# Patient Record
Sex: MALE | Race: ASIAN | URBAN - METROPOLITAN AREA
[De-identification: names, ages, dates, MRNs, and addresses within clinical notes are randomized per-mention and may not be internally consistent; named-entity substitution may affect disease eponyms.]

---

## 2017-02-28 ENCOUNTER — OFFICE VISIT (OUTPATIENT)
Dept: FAMILY MEDICINE | Facility: CLINIC | Age: 27
End: 2017-02-28
Payer: COMMERCIAL

## 2017-02-28 VITALS
OXYGEN SATURATION: 98 % | SYSTOLIC BLOOD PRESSURE: 90 MMHG | DIASTOLIC BLOOD PRESSURE: 62 MMHG | HEART RATE: 107 BPM | BODY MASS INDEX: 25.1 KG/M2 | TEMPERATURE: 97.4 F | WEIGHT: 165.6 LBS | HEIGHT: 68 IN

## 2017-02-28 DIAGNOSIS — M54.50 ACUTE MIDLINE LOW BACK PAIN WITHOUT SCIATICA: Primary | ICD-10-CM

## 2017-02-28 PROCEDURE — 99203 OFFICE O/P NEW LOW 30 MIN: CPT | Performed by: INTERNAL MEDICINE

## 2017-02-28 RX ORDER — CYCLOBENZAPRINE HCL 10 MG
5-10 TABLET ORAL 3 TIMES DAILY PRN
Qty: 30 TABLET | Refills: 1 | Status: SHIPPED | OUTPATIENT
Start: 2017-02-28 | End: 2018-02-15

## 2017-02-28 NOTE — PATIENT INSTRUCTIONS
You can take Aleve 1-2 tabs twice daily for 5-7 days.  This may help with some of the inflammation.      Make sure to walk every hour if you are able.  The more active you can be the better.

## 2017-02-28 NOTE — PROGRESS NOTES
"  SUBJECTIVE:                                                    Darren French is a 27 year old male who presents to clinic today for the following health issues:      Joint Pain/ back pain      Onset: 2 weeks ago     Description:   Location: lower back   Character: Sharp    Intensity: moderate, severe    Progression of Symptoms: intermittent    Accompanying Signs & Symptoms:  Other symptoms: none   History:   Previous similar pain: YES      Precipitating factors:   Trauma or overuse: YES, sits at work, prolonged sitting increases the pain     Alleviating factors:  Improved by: go for walk        Therapies Tried and outcome: bengay cream, somewhat helps     Darren is here with lower back pain x 2 weeks.  There were no preceding injuries.  He describes it as a \"stinging\" pain in the lower back; it does not radiate.  No symptoms in his legs.  He recalls having similar problems when he was younger, but the pain is worse now.  At it's worst the pain is an 8/10.  Does not wake him up at night.  It gets much better with activity.  He denies any back stiffness. He has no family history of early onset arthritis.     Darren is otherwise healthy and on no chronic medications.  He works for [employer] in [place].  He has been working long hours lately.      ROS:  Const, msk, neuro, skin reviewed, negative unless noted above.     OBJECTIVE:                                                    BP 90/62 (BP Location: Right arm, Patient Position: Chair, Cuff Size: Adult Regular)  Pulse 107  Temp 97.4  F (36.3  C) (Tympanic)  Ht 5' 7.5\" (1.715 m)  Wt 165 lb 9.6 oz (75.1 kg)  SpO2 98%  BMI 25.55 kg/m2  Body mass index is 25.55 kg/(m^2).    Gen: well appearing, pleasant young man, no distress  HEENT: PERRL, sclera nonicteric, MMM  Pulm: breathing comfortably, CTAB, no wheezes or rales  CV: RRR, normal S1 and S2, no murmurs  MSK: no spinal tenderness. Is tender over SI joints and lumbar paraspinal muscles.  Good forward flexion. "   Neuro: full strength in LEs b/l. 2+ patellar reflexes, symmetric.  Normal gait.       Diagnostic Test Results:  none      ASSESSMENT/PLAN:                                                        1. Acute midline low back pain without sciatica - localized pain without radiation, no injury, improves with movement.  Given his young age and SI joint pain, considered AS but he has no stiffness and seems to have good ROM with forward flexion.   - cyclobenzaprine (FLEXERIL) 10 MG tablet; Take 0.5-1 tablets (5-10 mg) by mouth 3 times daily as needed for muscle spasms  Dispense: 30 tablet; Refill: 1  - recommended aleve BID for one week   - try to get up at least briefly every hour at work.   - try heating pad and/or ice     F/U as needed for persistent or worsening symptoms.       Jennifer Marina MD  [facility]

## 2017-02-28 NOTE — MR AVS SNAPSHOT
After Visit Summary   2/28/2017    Darren French    MRN: [id number]           Patient Information     Date Of Birth          1990        Visit Information        Provider Department      2/28/2017 8:00 AM Jennifer Marina MD [facility]        Today's Diagnoses     Acute midline low back pain without sciatica    -  1      Care Instructions    You can take Aleve 1-2 tabs twice daily for 5-7 days.  This may help with some of the inflammation.      Make sure to walk every hour if you are able.  The more active you can be the better.         Follow-ups after your visit        Your next 10 appointments already scheduled     Mar 07, 2017  3:00 PM CST   Office Visit with Chemo Farias MD   [facility]    [address] [address] [phone]           Bring a current list of meds and any records pertaining to this visit.  For Physicals, please bring immunization records and any forms needing to be filled out.  Please arrive 10 minutes early to complete paperwork.              Who to contact     If you have questions or need follow up information about today's clinic visit or your schedule please contact [facility] directly at [phone].  Normal or non-critical lab and imaging results will be communicated to you by Numotehart, letter or phone within 4 business days after the clinic has received the results. If you do not hear from us within 7 days, please contact the clinic through Numotehart or phone. If you have a critical or abnormal lab result, we will notify you by phone as soon as possible.  Submit refill requests through Quanterix or call your pharmacy and they will forward the refill request to us. Please allow 3 business days for your refill to be completed.          Additional Information About Your Visit        NumoteharInMyShow Information     Quanterix lets you send messages to your doctor,  "view your test results, renew your prescriptions, schedule appointments and more. To sign up, go to [email or web address] . Click on \"Log in\" on the left side of the screen, which will take you to the Welcome page. Then click on \"Sign up Now\" on the right side of the page.     You will be asked to enter the access code listed below, as well as some personal information. Please follow the directions to create your username and password.     Your access code is: [email or web address]  Expires: 2017  8:28 AM     Your access code will  in 90 days. If you need help or a new code, please call your [place] clinic or [phone].        Care EveryWhere ID     This is your Care EveryWhere ID. This could be used by other organizations to access your [place] medical records  [id number]        Your Vitals Were     Pulse Temperature Height Pulse Oximetry BMI (Body Mass Index)       107 97.4  F (36.3  C) (Tympanic) 5' 7.5\" (1.715 m) 98% 25.55 kg/m2        Blood Pressure from Last 3 Encounters:   17 90/62    Weight from Last 3 Encounters:   17 165 lb 9.6 oz (75.1 kg)              Today, you had the following     No orders found for display         Today's Medication Changes          These changes are accurate as of: 17  8:28 AM.  If you have any questions, ask your nurse or doctor.               Start taking these medicines.        Dose/Directions    cyclobenzaprine 10 MG tablet   Commonly known as:  FLEXERIL   Used for:  Acute midline low back pain without sciatica   Started by:  Jennifer Marina MD        Dose:  5-10 mg   Take 0.5-1 tablets (5-10 mg) by mouth 3 times daily as needed for muscle spasms   Quantity:  30 tablet   Refills:  1            Where to get your medicines      These medications were sent to [facility] [place] [address]  [address] [address]     Phone:  [phone]     cyclobenzaprine 10 MG tablet       "          Primary Care Provider    None Specified       No primary provider on file.        Thank you!     Thank you for choosing [facility]  for your care. Our goal is always to provide you with excellent care. Hearing back from our patients is one way we can continue to improve our services. Please take a few minutes to complete the written survey that you may receive in the mail after your visit with us. Thank you!             Your Updated Medication List - Protect others around you: Learn how to safely use, store and throw away your medicines at www.disposemymeds.org.          This list is accurate as of: 2/28/17  8:28 AM.  Always use your most recent med list.                   Brand Name Dispense Instructions for use    cyclobenzaprine 10 MG tablet    FLEXERIL    30 tablet    Take 0.5-1 tablets (5-10 mg) by mouth 3 times daily as needed for muscle spasms

## 2017-02-28 NOTE — NURSING NOTE
"Chief Complaint   Patient presents with     Back Pain       Initial BP 90/62 (BP Location: Right arm, Patient Position: Chair, Cuff Size: Adult Regular)  Pulse 107  Temp 97.4  F (36.3  C) (Tympanic)  Ht 5' 7.5\" (1.715 m)  Wt 165 lb 9.6 oz (75.1 kg)  SpO2 98%  BMI 25.55 kg/m2 Estimated body mass index is 25.55 kg/(m^2) as calculated from the following:    Height as of this encounter: 5' 7.5\" (1.715 m).    Weight as of this encounter: 165 lb 9.6 oz (75.1 kg).  Medication Reconciliation: complete  "

## 2017-10-26 ENCOUNTER — OFFICE VISIT (OUTPATIENT)
Dept: FAMILY MEDICINE | Facility: CLINIC | Age: 27
End: 2017-10-26
Payer: COMMERCIAL

## 2017-10-26 ENCOUNTER — OFFICE VISIT (OUTPATIENT)
Dept: SURGERY | Facility: CLINIC | Age: 27
End: 2017-10-26
Payer: COMMERCIAL

## 2017-10-26 VITALS
DIASTOLIC BLOOD PRESSURE: 86 MMHG | HEIGHT: 67 IN | HEART RATE: 105 BPM | WEIGHT: 159 LBS | SYSTOLIC BLOOD PRESSURE: 138 MMHG | BODY MASS INDEX: 24.96 KG/M2

## 2017-10-26 VITALS
SYSTOLIC BLOOD PRESSURE: 120 MMHG | TEMPERATURE: 97.8 F | HEART RATE: 110 BPM | WEIGHT: 157 LBS | OXYGEN SATURATION: 97 % | BODY MASS INDEX: 23.79 KG/M2 | RESPIRATION RATE: 12 BRPM | HEIGHT: 68 IN | DIASTOLIC BLOOD PRESSURE: 84 MMHG

## 2017-10-26 DIAGNOSIS — L05.01 PILONIDAL CYST WITH ABSCESS: Primary | ICD-10-CM

## 2017-10-26 PROCEDURE — 99213 OFFICE O/P EST LOW 20 MIN: CPT | Performed by: INTERNAL MEDICINE

## 2017-10-26 PROCEDURE — 99203 OFFICE O/P NEW LOW 30 MIN: CPT | Performed by: SURGERY

## 2017-10-26 NOTE — MR AVS SNAPSHOT
After Visit Summary   10/26/2017    Darren French    MRN: [id number]           Patient Information     Date Of Birth          1990        Visit Information        Provider Department      10/26/2017 10:00 AM Minor Laura MD [facility]        Today's Diagnoses     Pilonidal cyst with abscess    -  1       Follow-ups after your visit        Additional Services     COLORECTAL SURGERY REFERRAL       Your provider has referred you to: [facility]: [facility] [phone]  [email or web address]    Referral Reason(s):  Infected pilonidal cyst  Special Concerns: None  This referral is: Emergent (24 hours)  It is OK to leave a message on patient's voicemail.    Please be aware that coverage of these services is subject to the terms and limitations of your health insurance plan.  Call member services at your health plan with any benefit or coverage questions.      Please bring the following with you to your appointment:    (1) Any X-Rays, CTs or MRIs which have been performed.  Contact the facility where they were done to arrange for  prior to your scheduled appointment.    (2) List of current medications  (3) This referral request   (4) Any documents/labs given to you for this referral                  Your next 10 appointments already scheduled     Oct 26, 2017 10:45 AM CDT   CONSULT with Adelfo Armando MD   [facility]    [address]  [facility] [id number] [phone]              Who to contact     If you have questions or need follow up information about today's clinic visit or your schedule please contact [facility] directly at [phone].  Normal or non-critical lab and imaging results will be communicated to you by MyChart, letter or phone within 4 business days after the clinic has received  "the results. If you do not hear from us within 7 days, please contact the clinic through HotDesk or phone. If you have a critical or abnormal lab result, we will notify you by phone as soon as possible.  Submit refill requests through HotDesk or call your pharmacy and they will forward the refill request to us. Please allow 3 business days for your refill to be completed.          Additional Information About Your Visit        HotDesk Information     HotDesk lets you send messages to your doctor, view your test results, renew your prescriptions, schedule appointments and more. To sign up, go to [email or web address] . Click on \"Log in\" on the left side of the screen, which will take you to the Welcome page. Then click on \"Sign up Now\" on the right side of the page.     You will be asked to enter the access code listed below, as well as some personal information. Please follow the directions to create your username and password.     Your access code is: [email or web address]  Expires: 2018 10:31 AM     Your access code will  in 90 days. If you need help or a new code, please call your [place] clinic or [phone].        Care EveryWhere ID     This is your Care EveryWhere ID. This could be used by other organizations to access your [place] medical records  [id number]        Your Vitals Were     Pulse Temperature Respirations Height Pulse Oximetry BMI (Body Mass Index)    110 97.8  F (36.6  C) (Tympanic) 12 5' 7.5\" (1.715 m) 97% 24.23 kg/m2       Blood Pressure from Last 3 Encounters:   10/26/17 120/84   17 90/62    Weight from Last 3 Encounters:   10/26/17 157 lb (71.2 kg)   17 165 lb 9.6 oz (75.1 kg)              We Performed the Following     COLORECTAL SURGERY REFERRAL        Primary Care Provider Office Phone # Fax #    [facility] [phone] [address] [phone]        Equal Access to Services     SUZANNE HARRIS AH: Hadii " pancho Garrett, wamissy cookadaha, qacasisdyta kavito karenmaylin, waxsondra demetra dhaliwalbharatsade ramirez brock. So [facility] [phone].    ATENCIÓN: Si habla español, tiene a castro disposición servicios gratuitos de asistencia lingüística. Llame al [phone].    We comply with applicable federal civil rights laws and [place] laws. We do not discriminate on the basis of race, color, national origin, age, disability, sex, sexual orientation, or gender identity.            Thank you!     Thank you for choosing [facility]  for your care. Our goal is always to provide you with excellent care. Hearing back from our patients is one way we can continue to improve our services. Please take a few minutes to complete the written survey that you may receive in the mail after your visit with us. Thank you!             Your Updated Medication List - Protect others around you: Learn how to safely use, store and throw away your medicines at www.disposemymeds.org.          This list is accurate as of: 10/26/17 10:31 AM.  Always use your most recent med list.                   Brand Name Dispense Instructions for use Diagnosis    cyclobenzaprine 10 MG tablet    FLEXERIL    30 tablet    Take 0.5-1 tablets (5-10 mg) by mouth 3 times daily as needed for muscle spasms    Acute midline low back pain without sciatica

## 2017-10-26 NOTE — NURSING NOTE
"Chief Complaint   Patient presents with     Derm Problem     /84  Pulse 110  Temp 97.8  F (36.6  C) (Tympanic)  Resp 12  Ht 5' 7.5\" (1.715 m)  Wt 157 lb (71.2 kg)  SpO2 97%  BMI 24.23 kg/m2 Estimated body mass index is 24.23 kg/(m^2) as calculated from the following:    Height as of this encounter: 5' 7.5\" (1.715 m).    Weight as of this encounter: 157 lb (71.2 kg).  BP completed using cuff size: regular   Patricia Dillno CMA    Health Maintenance Due   Topic Date Due     INFLUENZA VACCINE (SYSTEM ASSIGNED)  09/01/2017     Health Maintenance reviewed at today's visit patient asked to schedule/complete:   Immunizations:  Patient agrees to schedule    "

## 2017-10-26 NOTE — MR AVS SNAPSHOT
"              After Visit Summary   10/26/2017    Darren French    MRN: [id number]           Patient Information     Date Of Birth          1990        Visit Information        Provider Department      10/26/2017 10:45 AM Adelfo Armando MD [facility]      Today's Diagnoses     Pilonidal cyst with abscess    -  1       Follow-ups after your visit        Who to contact     If you have questions or need follow up information about today's clinic visit or your schedule please contact [facility] directly at [phone].  Normal or non-critical lab and imaging results will be communicated to you by iVilkahart, letter or phone within 4 business days after the clinic has received the results. If you do not hear from us within 7 days, please contact the clinic through iVilkahart or phone. If you have a critical or abnormal lab result, we will notify you by phone as soon as possible.  Submit refill requests through GEO'Supp or call your pharmacy and they will forward the refill request to us. Please allow 3 business days for your refill to be completed.          Additional Information About Your Visit        MyChart Information     GEO'Supp lets you send messages to your doctor, view your test results, renew your prescriptions, schedule appointments and more. To sign up, go to [email or web address] . Click on \"Log in\" on the left side of the screen, which will take you to the Welcome page. Then click on \"Sign up Now\" on the right side of the page.     You will be asked to enter the access code listed below, as well as some personal information. Please follow the directions to create your username and password.     Your access code is: [email or web address]  Expires: 2018 10:31 AM     Your access code will  in 90 days. If you need help or a new code, please call your [place] clinic or [phone].        Care EveryWhere ID     This is " "your Care EveryWhere ID. This could be used by other organizations to access your [place] medical records  [id number]        Your Vitals Were     Pulse Height BMI (Body Mass Index)             105 5' 6.53\" (1.69 m) 25.25 kg/m2          Blood Pressure from Last 3 Encounters:   10/26/17 138/86   10/26/17 120/84   02/28/17 90/62    Weight from Last 3 Encounters:   10/26/17 159 lb (72.1 kg)   10/26/17 157 lb (71.2 kg)   02/28/17 165 lb 9.6 oz (75.1 kg)              Today, you had the following     No orders found for display         Today's Medication Changes          These changes are accurate as of: 10/26/17 11:55 AM.  If you have any questions, ask your nurse or doctor.               Start taking these medicines.        Dose/Directions    amoxicillin-clavulanate 875-125 MG per tablet   Commonly known as:  AUGMENTIN   Used for:  Pilonidal cyst with abscess   Started by:  Adelfo Armando MD        Dose:  1 tablet   Take 1 tablet by mouth 2 times daily for 10 days   Quantity:  20 tablet   Refills:  0            Where to get your medicines      Some of these will need a paper prescription and others can be bought over the counter.  Ask your nurse if you have questions.     Bring a paper prescription for each of these medications     amoxicillin-clavulanate 875-125 MG per tablet                Primary Care Provider Office Phone # Fax #    [place] [facility] [phone] [address] [address]        Equal Access to Services     SUZANNE HARRIS : Hadjenny egan hadarto Soomaali, waaxda luqadaha, qaybta kaalmada adeegyada, giovanni idikevin gutiérrez. So [facility] [phone].    ATENCIÓN: Si habla español, tiene a castro disposición servicios gratuitos de asistencia lingüística. Llame al [phone].    We comply with applicable federal civil rights laws and [place] laws. We do not discriminate on the basis of race, color, national origin, age, " disability, sex, sexual orientation, or gender identity.            Thank you!     Thank you for choosing [facility]  for your care. Our goal is always to provide you with excellent care. Hearing back from our patients is one way we can continue to improve our services. Please take a few minutes to complete the written survey that you may receive in the mail after your visit with us. Thank you!             Your Updated Medication List - Protect others around you: Learn how to safely use, store and throw away your medicines at www.disposemymeds.org.          This list is accurate as of: 10/26/17 11:55 AM.  Always use your most recent med list.                   Brand Name Dispense Instructions for use Diagnosis    amoxicillin-clavulanate 875-125 MG per tablet    AUGMENTIN    20 tablet    Take 1 tablet by mouth 2 times daily for 10 days    Pilonidal cyst with abscess       cyclobenzaprine 10 MG tablet    FLEXERIL    30 tablet    Take 0.5-1 tablets (5-10 mg) by mouth 3 times daily as needed for muscle spasms    Acute midline low back pain without sciatica

## 2017-10-26 NOTE — PROGRESS NOTES
"[facility]  Surgery Consultation    HPI: Patient is a 27 year old male who is here for consultation requested by Clinic, [facility] [phone] for evaluation of infected pilonidal cyst. Has been present for 1 week. Has never had symptoms are known about it in the past. Has slowly increased in size and states it spreading to both sides of his upper buttock. Denies fevers or chills. Having normal bowel movements. Complains of pain but no other complaints at this time.Patient denies fevers, chills, nausea, vomiting, SOB, chest pain, abdominal pain.    PMH:  None  PSH:    has a past surgical history that includes orthopedic surgery.  Social History:   reports that he has never smoked. He has never used smokeless tobacco. He reports that he does not drink alcohol or use illicit drugs.  Family History:   family history includes DIABETES in his father; Unknown/Adopted in his mother. There is no history of Coronary Artery Disease, Hypertension, Hyperlipidemia, CEREBROVASCULAR DISEASE, Breast Cancer, Colon Cancer, Prostate Cancer, Other Cancer, Depression, Anxiety Disorder, MENTAL ILLNESS, Substance Abuse, Anesthesia Reaction, Asthma, OSTEOPOROSIS, Genetic Disorder, Thyroid Disease, or Obesity.  Medications/Allergies: Home medications and allergies reviewed.    ROS:  The 10 point Review of Systems is negative other than noted in the HPI.    Physical Exam:  /86  Pulse 105  Ht 5' 6.53\" (1.69 m)  Wt 159 lb (72.1 kg)  BMI 25.25 kg/m2  GENERAL: Generally appears well.  Psych: Alert and Oriented.  Normal affect  Eyes: Sclera clear  Respiratory:  Lungs clear to ausculation bilaterally with good air excursion  Cardiovascular:  Regular Rate and Rhythm with no murmurs gallops or rubs, normal peripheral pulses  GI: Abdomen Soft Non-Tender  Buttock-left cheek there is induration and tenderness. Containing tumor approximately 2-3 cm. No opening or fistula track noted. No drainage. No " fluctuation.  Integumentary:  No rashes  Neurological: grossly intact    All new lab and imaging data was reviewed.     Impression and Plan:  Patient is a 27 year old male with pilonidal cyst    PLAN:   I described the pathophysiology including medical and surgical management of pilonidal cyst. He currently has an indurated mass without significant fluctuation. I offered the patient antibiotic therapy with sitz baths versus attempt at I&D in the office today. Given the lack of fluctuation he may benefit from a trial of antibiotics prior to I&D. He is in agreement. I instructed the patient to take 20-30 minute sitz baths b.i.d. He will return to the office if his pain increases or he develops more swelling.    Thank you very much for this consult.    Adelfo Armando M.D.  [facility]  [phone]    Please route or send letter to:  Primary Care Provider (PCP) and Referring Provider

## 2017-10-26 NOTE — PROGRESS NOTES
SUBJECTIVE:   Darren French is a 27 year old male who presents to clinic today for the following health issues:    Derm Problem      Duration: x 1 week    Description (location/character/radiation): Cyst on Buttocks    Intensity:  severe    Accompanying signs and symptoms: Inflamed    History (similar episodes/previous evaluation): None    Precipitating or alleviating factors: None    Therapies tried and outcome: Sandal Paste/No Relief     HPI: Painful cyst above cleft in buttocks. No prior problems in this area    PMH:   There is no problem list on file for this patient.    Past Surgical History:   Procedure Laterality Date     ORTHOPEDIC SURGERY      left wrist ligament surgery       Social History   Substance Use Topics     Smoking status: Never Smoker     Smokeless tobacco: Never Used     Alcohol use No     Family History   Problem Relation Age of Onset     DIABETES Father      Unknown/Adopted Mother      Coronary Artery Disease No family hx of      Hypertension No family hx of      Hyperlipidemia No family hx of      CEREBROVASCULAR DISEASE No family hx of      Breast Cancer No family hx of      Colon Cancer No family hx of      Prostate Cancer No family hx of      Other Cancer No family hx of      Depression No family hx of      Anxiety Disorder No family hx of      MENTAL ILLNESS No family hx of      Substance Abuse No family hx of      Anesthesia Reaction No family hx of      Asthma No family hx of      OSTEOPOROSIS No family hx of      Genetic Disorder No family hx of      Thyroid Disease No family hx of      Obesity No family hx of          Current Outpatient Prescriptions   Medication Sig Dispense Refill     cyclobenzaprine (FLEXERIL) 10 MG tablet Take 0.5-1 tablets (5-10 mg) by mouth 3 times daily as needed for muscle spasms 30 tablet 1     No Known Allergies      ROS:  C: NEGATIVE for fever, chills, change in weight  R: NEGATIVE for significant cough or SOB  CV: NEGATIVE for chest pain,  "palpitations or peripheral edema    OBJECTIVE:                                                    /84  Pulse 110  Temp 97.8  F (36.6  C) (Tympanic)  Resp 12  Ht 5' 7.5\" (1.715 m)  Wt 157 lb (71.2 kg)  SpO2 97%  BMI 24.23 kg/m2  Body mass index is 24.23 kg/(m^2).  GENERAL: healthy, alert and no distress  SKIN:  Tender fluctuant mass in the area of  the buttock cleft    Diagnostic Test Results:  none      ASSESSMENT/PLAN:                                                    1. Pilonidal cyst with abscess    - COLORECTAL SURGERY REFERRAL    I called surgeon and he will be seeing the patient in the next hour.        Will call or return to clinic if worsening or symptoms not improving as discussed.  See Patient Instructions      Minor Laura MD  [facility]    "

## 2017-10-26 NOTE — LETTER
"2017    Re: Darren French - 1990    HPI: Patient is a 27 year old male who is here for consultation requested by Clinic, [facility] [phone] for evaluation of infected pilonidal cyst. Has been present for 1 week. Has never had symptoms are known about it in the past. Has slowly increased in size and states it spreading to both sides of his upper buttock. Denies fevers or chills. Having normal bowel movements. Complains of pain but no other complaints at this time.Patient denies fevers, chills, nausea, vomiting, SOB, chest pain, abdominal pain.     PMH:  None  PSH:    has a past surgical history that includes orthopedic surgery.  Social History:   reports that he has never smoked. He has never used smokeless tobacco. He reports that he does not drink alcohol or use illicit drugs.  Family History:   family history includes DIABETES in his father; Unknown/Adopted in his mother. There is no history of Coronary Artery Disease, Hypertension, Hyperlipidemia, CEREBROVASCULAR DISEASE, Breast Cancer, Colon Cancer, Prostate Cancer, Other Cancer, Depression, Anxiety Disorder, MENTAL ILLNESS, Substance Abuse, Anesthesia Reaction, Asthma, OSTEOPOROSIS, Genetic Disorder, Thyroid Disease, or Obesity.  Medications/Allergies: Home medications and allergies reviewed.     ROS:  The 10 point Review of Systems is negative other than noted in the HPI.     Physical Exam:  /86  Pulse 105  Ht 5' 6.53\" (1.69 m)  Wt 159 lb (72.1 kg)  BMI 25.25 kg/m2  GENERAL: Generally appears well.  Psych: Alert and Oriented.  Normal affect  Eyes: Sclera clear  Respiratory:  Lungs clear to ausculation bilaterally with good air excursion  Cardiovascular:  Regular Rate and Rhythm with no murmurs gallops or rubs, normal peripheral pulses  GI: Abdomen Soft Non-Tender  Buttock-left cheek there is induration and tenderness. Containing tumor approximately 2-3 cm. No opening or fistula track noted. No " drainage. No fluctuation.  Integumentary:  No rashes  Neurological: grossly intact     All new lab and imaging data was reviewed.      Impression and Plan:  Patient is a 27 year old male with pilonidal cyst     PLAN:   I described the pathophysiology including medical and surgical management of pilonidal cyst. He currently has an indurated mass without significant fluctuation. I offered the patient antibiotic therapy with sitz baths versus attempt at I&D in the office today. Given the lack of fluctuation he may benefit from a trial of antibiotics prior to I&D. He is in agreement. I instructed the patient to take 20-30 minute sitz baths b.i.d. He will return to the office if his pain increases or he develops more swelling.     Thank you very much for this consult.     Adelfo Armando M.D.  [facility]  [phone]

## 2017-11-07 ENCOUNTER — OFFICE VISIT (OUTPATIENT)
Dept: SURGERY | Facility: CLINIC | Age: 27
End: 2017-11-07
Payer: COMMERCIAL

## 2017-11-07 VITALS — SYSTOLIC BLOOD PRESSURE: 119 MMHG | DIASTOLIC BLOOD PRESSURE: 76 MMHG | HEART RATE: 78 BPM

## 2017-11-07 DIAGNOSIS — L05.01 PILONIDAL CYST WITH ABSCESS: Primary | ICD-10-CM

## 2017-11-07 PROCEDURE — 99213 OFFICE O/P EST LOW 20 MIN: CPT | Performed by: SURGERY

## 2017-11-07 NOTE — PROGRESS NOTES
Surgery  Note    Pilonidal wound after doing sitz baths. Pain much improved but still sore and limiting movement. No issues with bowel movements. No significant drainage. No fevers or chills.    Buttock-upper cyst with proud granulation tissue. Firmness and swelling has completely resolved. Minimal tenderness.    A/P  Darren French returns for follow-up regarding pilonidal cyst. This cyst has opened and has dramatically decreased in size. He has some proud flesh with tracts with no signs of persisting infection. I discussed chronic surgical management which would include a pilonidal cleft lift. This likely be curative for the patient. He does not feel this is a good time and would like to follow his symptoms and will call to schedule if they increase.    Thank you for the opportunity to help in his care.    Adelfo Armando M.D.  [facility]  [phone]    Please route or send letter to:  Primary Care Provider (PCP) and Referring Provider    TIME SPENT:  20 minutes was spent with patient and greater than 50% of the time was spent counseling and coordination of care.

## 2017-11-07 NOTE — LETTER
2017    Re: Darren French - 1990    Pilonidal wound after doing sitz baths. Pain much improved but still sore and limiting movement. No issues with bowel movements. No significant drainage. No fevers or chills.     Buttock-upper cyst with proud granulation tissue. Firmness and swelling has completely resolved. Minimal tenderness.     A/P  Darren French returns for follow-up regarding pilonidal cyst. This cyst has opened and has dramatically decreased in size. He has some proud flesh with tracts with no signs of persisting infection. I discussed chronic surgical management which would include a pilonidal cleft lift. This likely be curative for the patient. He does not feel this is a good time and would like to follow his symptoms and will call to schedule if they increase.     Thank you for the opportunity to help in his care.     Adelfo Armando M.D.  [facility]  [phone]

## 2017-11-07 NOTE — MR AVS SNAPSHOT
"              After Visit Summary   2017    Darren French    MRN: [id number]           Patient Information     Date Of Birth          1990        Visit Information        Provider Department      2017 1:30 PM Adelfo Armando MD [facility]      Today's Diagnoses     Pilonidal cyst with abscess    -  1       Follow-ups after your visit        Who to contact     If you have questions or need follow up information about today's clinic visit or your schedule please contact [facility] directly at [phone].  Normal or non-critical lab and imaging results will be communicated to you by Hello[employer], letter or phone within 4 business days after the clinic has received the results. If you do not hear from us within 7 days, please contact the clinic through [employer] or phone. If you have a critical or abnormal lab result, we will notify you by phone as soon as possible.  Submit refill requests through [employer] or call your pharmacy and they will forward the refill request to us. Please allow 3 business days for your refill to be completed.          Additional Information About Your Visit        MyChart Information     [employer] lets you send messages to your doctor, view your test results, renew your prescriptions, schedule appointments and more. To sign up, go to [email or web address] . Click on \"Log in\" on the left side of the screen, which will take you to the Welcome page. Then click on \"Sign up Now\" on the right side of the page.     You will be asked to enter the access code listed below, as well as some personal information. Please follow the directions to create your username and password.     Your access code is: [email or web address]  Expires: 2018  9:31 AM     Your access code will  in 90 days. If you need help or a new code, please call your [place] clinic or [phone].        Care EveryWhere ID     This is " your Care EveryWhere ID. This could be used by other organizations to access your [place] medical records  [id number]        Your Vitals Were     Pulse                   78            Blood Pressure from Last 3 Encounters:   11/07/17 119/76   10/26/17 138/86   10/26/17 120/84    Weight from Last 3 Encounters:   10/26/17 159 lb (72.1 kg)   10/26/17 157 lb (71.2 kg)   02/28/17 165 lb 9.6 oz (75.1 kg)              Today, you had the following     No orders found for display       Primary Care Provider Office Phone # Fax #    Minor Saturnino Laura [phone] [address] [place] [address]        Equal Access to Services     [facility] CountyJAVIER : Hadii pancho egan hadarto Sogrzegorz, waaxda luqadaha, qaybta kaalmada adeegyada, giovanni ramirez . So [facility] [phone].    ATENCIÓN: Si habla español, tiene a castro disposición servicios gratuitos de asistencia lingüística. Ll[facility] State [facility] [phone].    We comply with applicable federal civil rights laws and [place] laws. We do not discriminate on the basis of race, color, national origin, age, disability, sex, sexual orientation, or gender identity.            Thank you!     Thank you for choosing [facility]  for your care. Our goal is always to provide you with excellent care. Hearing back from our patients is one way we can continue to improve our services. Please take a few minutes to complete the written survey that you may receive in the mail after your visit with us. Thank you!             Your Updated Medication List - Protect others around you: Learn how to safely use, store and throw away your medicines at www.disposemymeds.org.          This list is accurate as of: 11/7/17  2:04 PM.  Always use your most recent med list.                   Brand Name Dispense Instructions for use Diagnosis    cyclobenzaprine 10 MG tablet    FLEXERIL    30 tablet    Take 0.5-1 tablets (5-10 mg) by mouth 3 times daily as needed for  muscle spasms    Acute midline low back pain without sciatica

## 2018-02-15 ENCOUNTER — OFFICE VISIT (OUTPATIENT)
Dept: URGENT CARE | Facility: URGENT CARE | Age: 28
End: 2018-02-15
Payer: COMMERCIAL

## 2018-02-15 VITALS
SYSTOLIC BLOOD PRESSURE: 120 MMHG | OXYGEN SATURATION: 96 % | WEIGHT: 163.8 LBS | TEMPERATURE: 102.7 F | BODY MASS INDEX: 26.01 KG/M2 | RESPIRATION RATE: 22 BRPM | DIASTOLIC BLOOD PRESSURE: 70 MMHG | HEART RATE: 135 BPM

## 2018-02-15 DIAGNOSIS — J10.1 INFLUENZA A: ICD-10-CM

## 2018-02-15 DIAGNOSIS — R50.9 FEVER IN ADULT: Primary | ICD-10-CM

## 2018-02-15 LAB
FLUAV+FLUBV AG SPEC QL: NEGATIVE
FLUAV+FLUBV AG SPEC QL: POSITIVE
SPECIMEN SOURCE: ABNORMAL

## 2018-02-15 PROCEDURE — 99213 OFFICE O/P EST LOW 20 MIN: CPT | Performed by: PHYSICIAN ASSISTANT

## 2018-02-15 PROCEDURE — 87804 INFLUENZA ASSAY W/OPTIC: CPT | Performed by: PHYSICIAN ASSISTANT

## 2018-02-15 RX ORDER — OSELTAMIVIR PHOSPHATE 75 MG/1
75 CAPSULE ORAL 2 TIMES DAILY
Qty: 10 CAPSULE | Refills: 0 | Status: SHIPPED | OUTPATIENT
Start: 2018-02-15

## 2018-02-15 RX ORDER — IBUPROFEN 200 MG
200 TABLET ORAL EVERY 4 HOURS PRN
COMMUNITY

## 2018-02-15 NOTE — PATIENT INSTRUCTIONS
Influenza (Adult)    Influenza is also called the flu. It is a viral illness that affects the air passages of your lungs. It is different from the common cold. The flu can easily be passed from one to person to another. It may be spread through the air by coughing and sneezing. Or it can be spread by touching the sick person and then touching your own eyes, nose, or mouth.  The flu starts 1 to 3 days after you are exposed to the flu virus. It may last for 1 to 2 weeks but many people feel tired or fatigued for many weeks afterward. You usually don t need to take antibiotics unless you have a complication. This might be an ear or sinus infection or pneumonia.  Symptoms of the flu may be mild or severe. They can include extreme tiredness (wanting to stay in bed all day), chills, fevers, muscle aches, soreness with eye movement, headache, and a dry, hacking cough.  Home care  Follow these guidelines when caring for yourself at home:    Avoid being around cigarette smoke, whether yours or other people s.    Acetaminophen or ibuprofen will help ease your fever, muscle aches, and headache. Don t give aspirin to anyone younger than 18 who has the flu. Aspirin can harm the liver.    Nausea and loss of appetite are common with the flu. Eat light meals. Drink 6 to 8 glasses of liquids every day. Good choices are water, sport drinks, soft drinks without caffeine, juices, tea, and soup. Extra fluids will also help loosen secretions in your nose and lungs.    Over-the-counter cold medicines will not make the flu go away faster. But the medicines may help with coughing, sore throat, and congestion in your nose and sinuses. Don t use a decongestant if you have high blood pressure.    Stay home until your fever has been gone for at least 24 hours without using medicine to reduce fever.  Follow-up care  Follow up with your healthcare provider, or as advised, if you are not getting better over the next week.  If you are age 65 or  older, talk with your provider about getting a pneumococcal vaccine every 5 years. You should also get this vaccine if you have chronic asthma or COPD. All adults should get a flu vaccine every fall. Ask your provider about this.  When to seek medical advice  Call your healthcare provider right away if any of these occur:    Cough with lots of colored mucus (sputum) or blood in your mucus    Chest pain, shortness of breath, wheezing, or trouble breathing    Severe headache, or face, neck, or ear pain    New rash with fever    Fever of 100.4 F (38 C) or higher, or as directed by your healthcare provider    Confusion, behavior change, or seizure    Severe weakness or dizziness    You get a new fever or cough after getting better for a few days  Date Last Reviewed: 1/1/2017 2000-2017 [employer]. [address] [place] [address], [place] [address]. All rights reserved. This information is not intended as a substitute for professional medical care. Always follow your healthcare professional's instructions.

## 2018-02-15 NOTE — MR AVS SNAPSHOT
After Visit Summary   2/15/2018    Darren Frecnh    MRN: [id number]           Patient Information     Date Of Birth          1990        Visit Information        Provider Department      2/15/2018 1:10 PM Jos Montgomery PA-C [facility]        Today's Diagnoses     Fever in adult    -  1    Influenza A          Care Instructions      Influenza (Adult)    Influenza is also called the flu. It is a viral illness that affects the air passages of your lungs. It is different from the common cold. The flu can easily be passed from one to person to another. It may be spread through the air by coughing and sneezing. Or it can be spread by touching the sick person and then touching your own eyes, nose, or mouth.  The flu starts 1 to 3 days after you are exposed to the flu virus. It may last for 1 to 2 weeks but many people feel tired or fatigued for many weeks afterward. You usually don t need to take antibiotics unless you have a complication. This might be an ear or sinus infection or pneumonia.  Symptoms of the flu may be mild or severe. They can include extreme tiredness (wanting to stay in bed all day), chills, fevers, muscle aches, soreness with eye movement, headache, and a dry, hacking cough.  Home care  Follow these guidelines when caring for yourself at home:    Avoid being around cigarette smoke, whether yours or other people s.    Acetaminophen or ibuprofen will help ease your fever, muscle aches, and headache. Don t give aspirin to anyone younger than 18 who has the flu. Aspirin can harm the liver.    Nausea and loss of appetite are common with the flu. Eat light meals. Drink 6 to 8 glasses of liquids every day. Good choices are water, sport drinks, soft drinks without caffeine, juices, tea, and soup. Extra fluids will also help loosen secretions in your nose and lungs.    Over-the-counter cold medicines will not make the flu go away faster. But the medicines  may help with coughing, sore throat, and congestion in your nose and sinuses. Don t use a decongestant if you have high blood pressure.    Stay home until your fever has been gone for at least 24 hours without using medicine to reduce fever.  Follow-up care  Follow up with your healthcare provider, or as advised, if you are not getting better over the next week.  If you are age 65 or older, talk with your provider about getting a pneumococcal vaccine every 5 years. You should also get this vaccine if you have chronic asthma or COPD. All adults should get a flu vaccine every fall. Ask your provider about this.  When to seek medical advice  Call your healthcare provider right away if any of these occur:    Cough with lots of colored mucus (sputum) or blood in your mucus    Chest pain, shortness of breath, wheezing, or trouble breathing    Severe headache, or face, neck, or ear pain    New rash with fever    Fever of 100.4 F (38 C) or higher, or as directed by your healthcare provider    Confusion, behavior change, or seizure    Severe weakness or dizziness    You get a new fever or cough after getting better for a few days  Date Last Reviewed: 1/1/2017 2000-2017 The [employer]. [address] [place] [address]. All rights reserved. This information is not intended as a substitute for professional medical care. Always follow your healthcare professional's instructions.                Follow-ups after your visit        Who to contact     If you have questions or need follow up information about today's clinic visit or your schedule please contact [facility] directly at [phone].  Normal or non-critical lab and imaging results will be communicated to you by MyChart, letter or phone within 4 business days after the clinic has received the results. If you do not hear from us within 7 days, please contact the clinic through MyChart or phone. If you have a critical or  "abnormal lab result, we will notify you by phone as soon as possible.  Submit refill requests through [employer] or call your pharmacy and they will forward the refill request to us. Please allow 3 business days for your refill to be completed.          Additional Information About Your Visit        Around[employer]hart Information     [employer] lets you send messages to your doctor, view your test results, renew your prescriptions, schedule appointments and more. To sign up, go to [email or web address] . Click on \"Log in\" on the left side of the screen, which will take you to the Welcome page. Then click on \"Sign up Now\" on the right side of the page.     You will be asked to enter the access code listed below, as well as some personal information. Please follow the directions to create your username and password.     Your access code is: [email or web address]  Expires: 2018  8:12 PM     Your access code will  in 90 days. If you need help or a new code, please call your [place] clinic or [phone].        Care EveryWhere ID     This is your Care EveryWhere ID. This could be used by other organizations to access your [place] medical records  [id number]        Your Vitals Were     Pulse Temperature Respirations Pulse Oximetry BMI (Body Mass Index)       135 102.7  F (39.3  C) 22 96% 26.01 kg/m2        Blood Pressure from Last 3 Encounters:   02/15/18 120/70   17 119/76   10/26/17 138/86    Weight from Last 3 Encounters:   02/15/18 163 lb 12.8 oz (74.3 kg)   10/26/17 159 lb (72.1 kg)   10/26/17 157 lb (71.2 kg)              We Performed the Following     Influenza A/B antigen          Today's Medication Changes          These changes are accurate as of 2/15/18 11:59 PM.  If you have any questions, ask your nurse or doctor.               Start taking these medicines.        Dose/Directions    * oseltamivir 75 MG capsule   Commonly known as:  TAMIFLU   Used for:  Influenza A   Started by:  Jos Montgomery PA-C        " Dose:  75 mg   Take 1 capsule (75 mg) by mouth 2 times daily   Quantity:  10 capsule   Refills:  0       * oseltamivir 75 MG capsule   Commonly known as:  TAMIFLU   Used for:  Influenza A   Started by:  Jos Montgomery PA-C        Dose:  75 mg   Take 1 capsule (75 mg) by mouth 2 times daily   Quantity:  10 capsule   Refills:  0       * Notice:  This list has 2 medication(s) that are the same as other medications prescribed for you. Read the directions carefully, and ask your doctor or other care provider to review them with you.         Where to get your medicines      These medications were sent to [facility] [place], [place] - [address]  [address], [facility] [address]     Phone:  [phone]     oseltamivir 75 MG capsule         These medications were sent to [facility] [id number] [place] [address] [address] AT [facility] [address]  [address] [facility] [address]     Phone:  [phone]     oseltamivir 75 MG capsule                Primary Care Provider Office Phone # Fax #    Minor Laura [phone] [address] [place] [address]        Equal Access to Services     SUZANNE HARRIS AH: Hadii aad ku hadasho Soomaali, waaxda luqadaha, qaybta kaalmada adeegyada, waxay idiin hayliann maye gutiérrez. So [facility] [phone].    ATENCIÓN: Si habla español, tiene a castro disposición servicios gratuitos de asistencia lingüística. Llame al [phone].    We comply with applicable federal civil rights laws and [place] laws. We do not discriminate on the basis of race, color, national origin, age, disability, sex, sexual orientation, or gender identity.            Thank you!     Thank you for choosing [facility]  for your care. Our goal is always to provide you with excellent care. Hearing back from our patients is one way we can continue to improve our services. Please take a few minutes to complete the  written survey that you may receive in the mail after your visit with us. Thank you!             Your Updated Medication List - Protect others around you: Learn how to safely use, store and throw away your medicines at www.disposemymeds.org.          This list is accurate as of 2/15/18 11:59 PM.  Always use your most recent med list.                   Brand Name Dispense Instructions for use Diagnosis    ibuprofen 200 MG tablet    ADVIL/MOTRIN     Take 200 mg by mouth every 4 hours as needed for mild pain        * oseltamivir 75 MG capsule    TAMIFLU    10 capsule    Take 1 capsule (75 mg) by mouth 2 times daily    Influenza A       * oseltamivir 75 MG capsule    TAMIFLU    10 capsule    Take 1 capsule (75 mg) by mouth 2 times daily    Influenza A       * Notice:  This list has 2 medication(s) that are the same as other medications prescribed for you. Read the directions carefully, and ask your doctor or other care provider to review them with you.

## 2018-02-20 NOTE — PROGRESS NOTES
SUBJECTIVE:   Darren French is a 28 year old male presenting with a chief complaint of fever, chills, runny nose, cough - non-productive and body aches.  Onset of symptoms was 2 day(s) ago.  Course of illness is same.    Severity moderate  Current and Associated symptoms: coughing, body aches, fever  Treatment measures tried include Tylenol/Ibuprofen.  Predisposing factors include none.    No past medical history on file.     ALLERGIES   No Known Allergies      Social History   Substance Use Topics     Smoking status: Never Smoker     Smokeless tobacco: Never Used     Alcohol use No       ROS:  CONSTITUTIONAL:POSITIVE  for fever   INTEGUMENTARY/SKIN: NEGATIVE for worrisome rashes, moles or lesions  ENT/MOUTH: POSITIVE for nasal congestion  RESP:POSITIVE for cough-non productive  CV: NEGATIVE for chest pain, palpitations or peripheral edema  GI: negative for nausea, vomiting and diarrhea  MUSCULOSKELETAL: Positive for body aches  NEURO: NEGATIVE for weakness, dizziness or paresthesias    OBJECTIVE  :/70  Pulse 135  Temp 102.7  F (39.3  C)  Resp 22  Wt 163 lb 12.8 oz (74.3 kg)  SpO2 96%  BMI 26.01 kg/m2  GENERAL APPEARANCE: healthy, alert and no distress  EYES: EOMI,  PERRL, conjunctiva clear  HENT: TM's normal bilaterally and rhinorrhea   NECK: supple, nontender, no lymphadenopathy  RESP: lungs clear to auscultation - no rales, rhonchi or wheezes  CV: regular rates and rhythm, normal S1 S2, no murmur noted  ABDOMEN:  soft, nontender, no HSM or masses and bowel sounds normal  NEURO: Normal strength and tone, sensory exam grossly normal,  normal speech and mentation  SKIN: no suspicious lesions or rashes    Results for orders placed or performed in visit on 02/15/18   Influenza A/B antigen   Result Value Ref Range    Influenza A/B Agn Specimen Nasopharyngeal     Influenza A Positive (A) NEG^Negative    Influenza B Negative NEG^Negative       ASSESSMENT/PLAN:      ICD-10-CM    1. Fever in adult R50.9  Influenza A/B antigen   2. Influenza A J10.1 oseltamivir (TAMIFLU) 75 MG capsule     oseltamivir (TAMIFLU) 75 MG capsule       Patient given information about influenza.  Patient understands they are contagious until their fever has resolved without the use of motrin or tylenol.  At that time they can return to school/work.  Patient is to monitor for any worsening symptoms and return to the clinic if this occurs.  The most common complication of influenza is Pneumonia or other respiratory problems especially in those with underlying lung problems including asthma and COPD.  Patient will follow up if this occurs.  See orders in Epic